# Patient Record
Sex: FEMALE
[De-identification: names, ages, dates, MRNs, and addresses within clinical notes are randomized per-mention and may not be internally consistent; named-entity substitution may affect disease eponyms.]

---

## 2019-01-22 PROBLEM — Z00.00 ENCOUNTER FOR PREVENTIVE HEALTH EXAMINATION: Status: ACTIVE | Noted: 2019-01-22

## 2019-01-29 ENCOUNTER — APPOINTMENT (OUTPATIENT)
Dept: OBGYN | Facility: CLINIC | Age: 29
End: 2019-01-29
Payer: COMMERCIAL

## 2019-01-29 ENCOUNTER — LABORATORY RESULT (OUTPATIENT)
Age: 29
End: 2019-01-29

## 2019-01-29 VITALS
OXYGEN SATURATION: 98 % | HEIGHT: 68 IN | TEMPERATURE: 98 F | DIASTOLIC BLOOD PRESSURE: 82 MMHG | SYSTOLIC BLOOD PRESSURE: 118 MMHG | BODY MASS INDEX: 21.07 KG/M2 | WEIGHT: 139 LBS | HEART RATE: 97 BPM

## 2019-01-29 PROCEDURE — 81025 URINE PREGNANCY TEST: CPT

## 2019-01-29 PROCEDURE — 36415 COLL VENOUS BLD VENIPUNCTURE: CPT

## 2019-01-29 PROCEDURE — 99385 PREV VISIT NEW AGE 18-39: CPT | Mod: 25

## 2019-01-29 NOTE — PROCEDURE
[Cervical Pap Smear] : cervical Pap smear [Liquid Base] : liquid base [GC & Chlamydia via Pap] : GC & Chlamydia via Pap [Tolerated Well] : the patient tolerated the procedure well [No Complications] : there were no complications [Intrauterine Pregnancy] : intrauterine pregnancy [Yolk Sac] : yolk sac present [Fetal Heart] : fetal heart present [CRL: ___ (mm)] : CRL - [unfilled]Umm [Current GA by Sonogram: ___ (wks)] : Current GA by Sonogram: [unfilled]Uwks [___ day(s)] : [unfilled] days [WNL] : Transvaginal OB Sonogram WNL

## 2019-01-30 ENCOUNTER — TRANSCRIPTION ENCOUNTER (OUTPATIENT)
Age: 29
End: 2019-01-30

## 2019-01-30 LAB
ABO + RH PNL BLD: NORMAL
BASOPHILS # BLD AUTO: 0.02 K/UL
BASOPHILS NFR BLD AUTO: 0.3 %
BLD GP AB SCN SERPL QL: NORMAL
C TRACH RRNA SPEC QL NAA+PROBE: NOT DETECTED
EOSINOPHIL # BLD AUTO: 0.06 K/UL
EOSINOPHIL NFR BLD AUTO: 0.8 %
HCG SERPL-MCNC: ABNORMAL MIU/ML
HCG UR QL: POSITIVE
HCT VFR BLD CALC: 40.4 %
HGB BLD-MCNC: 12.7 G/DL
IMM GRANULOCYTES NFR BLD AUTO: 0.1 %
LYMPHOCYTES # BLD AUTO: 1.72 K/UL
LYMPHOCYTES NFR BLD AUTO: 22.6 %
MAN DIFF?: NORMAL
MCHC RBC-ENTMCNC: 27.5 PG
MCHC RBC-ENTMCNC: 31.4 GM/DL
MCV RBC AUTO: 87.4 FL
MONOCYTES # BLD AUTO: 0.57 K/UL
MONOCYTES NFR BLD AUTO: 7.5 %
N GONORRHOEA RRNA SPEC QL NAA+PROBE: NOT DETECTED
NEUTROPHILS # BLD AUTO: 5.23 K/UL
NEUTROPHILS NFR BLD AUTO: 68.7 %
PLATELET # BLD AUTO: 234 K/UL
RBC # BLD: 4.62 M/UL
RBC # FLD: 13.4 %
SOURCE TP AMPLIFICATION: NORMAL
WBC # FLD AUTO: 7.61 K/UL

## 2019-01-31 LAB — BACTERIA UR CULT: NORMAL

## 2019-02-01 ENCOUNTER — APPOINTMENT (OUTPATIENT)
Dept: ANTEPARTUM | Facility: CLINIC | Age: 29
End: 2019-02-01
Payer: MEDICAID

## 2019-02-01 ENCOUNTER — ASOB RESULT (OUTPATIENT)
Age: 29
End: 2019-02-01

## 2019-02-01 PROCEDURE — 76801 OB US < 14 WKS SINGLE FETUS: CPT

## 2019-02-05 LAB — CYTOLOGY CVX/VAG DOC THIN PREP: NORMAL

## 2019-02-11 ENCOUNTER — NON-APPOINTMENT (OUTPATIENT)
Age: 29
End: 2019-02-11

## 2019-02-11 NOTE — CHIEF COMPLAINT
[Annual Visit] : annual visit [FreeTextEntry1] : -Patient presents for initial gynecological exam today.  LMP (2018), positive pregnancy test with her PCP.   This is a planned pregnancy, currently wants to continue her pregnancy. \par \par -Patient is a  with IUP 9.0 weeks with TESSA~2019   by LMP.  Patient has not had any prenatal care prior to today's  visit.  \par \par -Patient has been seen at the emergency room for vaginal spotting (2018) counseled to avoid sexual intercourse and to follow up with Ob/Gyn, denies any further symptoms since that ER visit.  Patient denies any vaginal bleeding or pelvic pain currently.  \par \par -Patient is taking prenatal vitamins daily prescribed by PCP\par \par -Patient denies history of recent travel outside of [place], nor has her partner travelled anywhere outside of [place] in the past 12 months. \par \par -Currently patient denies experiencing any vaginal itching, irregular vaginal bleeding or malodorous vaginal discharge.  Patient denies history of urinary symptoms.\par \par -Patient's last pap smear was approximately 2 years ago, WNL as per patient.  Patient denies history of abnormal pap smears.    \par \par -Patient was using OCP for contraception stopped (2018).   \par \par -Patient's denies any history of sexually transmitted diseases.   \par \par \par A/P: 28 y /o    with IUP 9.0 weeks by LMP\par \par -Pap smear / HPV screen / Gonorrhea / Chlamydia  sent today\par \par -(+) Office pregnancy test - (+) - Early IUP ~ (+)8 weeks / Gestational sac / Yolk sac noted / (+) FHR @ 160’s BPM noted by bedside ultrasound. \par \par - Urine Culture / T&S / CBC / bHCG quantitative level x 1 ordered today.\par \par -MFM – Referral for Ob sonogram for dating and pregnancy viability / FTS - referral given today\par \par -Zika Screen not needed\par \par -Advised to start daily Prenatal vitamin and Iron supplement, prescription sent to pharmacy today. \par \par -Patient was counseled on precautions for miscarriage, signs and symptoms including sudden onset of pelvic pain with or without vaginal bleeding, patient was advised to seek care at the nearest Emergency Room for evaluation and subsequently follow up at our office. \par \par -Send initial Prenatal labs next visit after pregnancy viability is confirmed.\par \par -RTO 4  weeks for initial prenatal visit.\par \par \par

## 2019-02-11 NOTE — HISTORY OF PRESENT ILLNESS
[Definite:  ___ (Date)] : the last menstrual period was [unfilled] [Normal Amount/Duration] : was of a normal amount and duration [Regular Cycle Intervals] : periods have been regular [Frequency: Q ___ days] : menstrual periods occur approximately every [unfilled] days [Menarche Age: ____] : age at menarche was [unfilled] [Menstrual Cramps] : menstrual cramps [Sexually Active] : is sexually active [Monogamous] : is monogamous [Male ___] : [unfilled] male [No Risk Factors, Testing Not Required] : no risk factors, testing is not required. [Spotting Between  Menses] : no spotting between menses [On BCP at conception] : the patient was not on BCP at conception [Contraception] : does not use contraception

## 2019-02-11 NOTE — PHYSICAL EXAM
[Awake] : awake [Alert] : alert [Soft] : soft [Oriented x3] : oriented to person, place, and time [Labia Majora] : labia major [Labia Minora] : labia minora [Normal] : clitoris [No Bleeding] : there was no active vaginal bleeding [Pap Obtained] : a Pap smear was performed [Normal Position] : in a normal position [Uterine Adnexae] : were not tender and not enlarged [RRR, No Murmurs] : RRR, no murmurs [CTAB] : CTAB [Acute Distress] : no acute distress [Mass] : no breast mass [Nipple Discharge] : no nipple discharge [Axillary LAD] : no axillary lymphadenopathy [Tender] : non tender [Discharge] : had no discharge [Tenderness] : nontender [Enlarged ___ wks] : not enlarged

## 2019-02-22 ENCOUNTER — APPOINTMENT (OUTPATIENT)
Dept: ANTEPARTUM | Facility: CLINIC | Age: 29
End: 2019-02-22
Payer: MEDICAID

## 2019-02-22 ENCOUNTER — ASOB RESULT (OUTPATIENT)
Age: 29
End: 2019-02-22

## 2019-02-22 PROCEDURE — 76801 OB US < 14 WKS SINGLE FETUS: CPT

## 2019-02-22 PROCEDURE — 36416 COLLJ CAPILLARY BLOOD SPEC: CPT

## 2019-02-22 PROCEDURE — 76813 OB US NUCHAL MEAS 1 GEST: CPT

## 2019-03-01 ENCOUNTER — NON-APPOINTMENT (OUTPATIENT)
Age: 29
End: 2019-03-01

## 2019-03-01 ENCOUNTER — TRANSCRIPTION ENCOUNTER (OUTPATIENT)
Age: 29
End: 2019-03-01

## 2019-03-01 ENCOUNTER — APPOINTMENT (OUTPATIENT)
Dept: OBGYN | Facility: CLINIC | Age: 29
End: 2019-03-01
Payer: COMMERCIAL

## 2019-03-01 VITALS — WEIGHT: 139 LBS | BODY MASS INDEX: 21.07 KG/M2 | HEIGHT: 68 IN

## 2019-03-01 VITALS
HEIGHT: 68 IN | BODY MASS INDEX: 21.07 KG/M2 | WEIGHT: 139 LBS | SYSTOLIC BLOOD PRESSURE: 122 MMHG | DIASTOLIC BLOOD PRESSURE: 67 MMHG

## 2019-03-01 DIAGNOSIS — Z34.90 ENCOUNTER FOR SUPERVISION OF NORMAL PREGNANCY, UNSPECIFIED, UNSPECIFIED TRIMESTER: ICD-10-CM

## 2019-03-01 DIAGNOSIS — Z01.419 ENCOUNTER FOR GYNECOLOGICAL EXAMINATION (GENERAL) (ROUTINE) W/OUT ABNORMAL FINDINGS: ICD-10-CM

## 2019-03-01 PROCEDURE — 0502F SUBSEQUENT PRENATAL CARE: CPT

## 2019-03-04 ENCOUNTER — EMERGENCY (EMERGENCY)
Facility: HOSPITAL | Age: 29
LOS: 1 days | Discharge: HOME | End: 2019-03-04
Attending: EMERGENCY MEDICINE
Payer: COMMERCIAL

## 2019-03-04 VITALS
DIASTOLIC BLOOD PRESSURE: 83 MMHG | OXYGEN SATURATION: 100 % | TEMPERATURE: 99 F | SYSTOLIC BLOOD PRESSURE: 135 MMHG | HEART RATE: 137 BPM | HEIGHT: 66 IN | WEIGHT: 138.89 LBS | RESPIRATION RATE: 20 BRPM

## 2019-03-04 VITALS
DIASTOLIC BLOOD PRESSURE: 70 MMHG | SYSTOLIC BLOOD PRESSURE: 110 MMHG | OXYGEN SATURATION: 100 % | TEMPERATURE: 98 F | HEART RATE: 106 BPM | RESPIRATION RATE: 20 BRPM

## 2019-03-04 DIAGNOSIS — Z98.82 BREAST IMPLANT STATUS: Chronic | ICD-10-CM

## 2019-03-04 LAB
ACETONE SERPL-MCNC: NEGATIVE — SIGNIFICANT CHANGE UP
ALBUMIN SERPL ELPH-MCNC: 3.2 G/DL — LOW (ref 3.5–5)
ALP SERPL-CCNC: 39 U/L — LOW (ref 40–120)
ALT FLD-CCNC: 20 U/L DA — SIGNIFICANT CHANGE UP (ref 10–60)
ANION GAP SERPL CALC-SCNC: 10 MMOL/L — SIGNIFICANT CHANGE UP (ref 5–17)
ANISOCYTOSIS BLD QL: SLIGHT — SIGNIFICANT CHANGE UP
APPEARANCE UR: CLEAR — SIGNIFICANT CHANGE UP
AST SERPL-CCNC: 17 U/L — SIGNIFICANT CHANGE UP (ref 10–40)
BACTERIA # UR AUTO: ABNORMAL /HPF
BACTERIA UR CULT: NORMAL
BASOPHILS # BLD AUTO: 0.01 K/UL — SIGNIFICANT CHANGE UP (ref 0–0.2)
BASOPHILS NFR BLD AUTO: 0.1 % — SIGNIFICANT CHANGE UP (ref 0–2)
BILIRUB SERPL-MCNC: 0.5 MG/DL — SIGNIFICANT CHANGE UP (ref 0.2–1.2)
BILIRUB UR-MCNC: NEGATIVE — SIGNIFICANT CHANGE UP
BUN SERPL-MCNC: 10 MG/DL — SIGNIFICANT CHANGE UP (ref 7–18)
CALCIUM SERPL-MCNC: 8.1 MG/DL — LOW (ref 8.4–10.5)
CHLORIDE SERPL-SCNC: 105 MMOL/L — SIGNIFICANT CHANGE UP (ref 96–108)
CO2 SERPL-SCNC: 21 MMOL/L — LOW (ref 22–31)
COLOR SPEC: YELLOW — SIGNIFICANT CHANGE UP
COMMENT - URINE: SIGNIFICANT CHANGE UP
CREAT SERPL-MCNC: 0.56 MG/DL — SIGNIFICANT CHANGE UP (ref 0.5–1.3)
DIFF PNL FLD: ABNORMAL
EOSINOPHIL # BLD AUTO: 0 K/UL — SIGNIFICANT CHANGE UP (ref 0–0.5)
EOSINOPHIL NFR BLD AUTO: 0 % — SIGNIFICANT CHANGE UP (ref 0–6)
EPI CELLS # UR: ABNORMAL /HPF
GLUCOSE SERPL-MCNC: 84 MG/DL — SIGNIFICANT CHANGE UP (ref 70–99)
GLUCOSE UR QL: NEGATIVE — SIGNIFICANT CHANGE UP
HBA1C MFR BLD HPLC: 5 %
HBV SURFACE AG SER QL: NONREACTIVE
HCG SERPL-ACNC: HIGH MIU/ML
HCT VFR BLD CALC: 39.1 % — SIGNIFICANT CHANGE UP (ref 34.5–45)
HCV AB SER QL: NONREACTIVE
HCV S/CO RATIO: 0.08 S/CO
HGB BLD-MCNC: 12.9 G/DL — SIGNIFICANT CHANGE UP (ref 11.5–15.5)
HIV1+2 AB SPEC QL IA.RAPID: NONREACTIVE
IMM GRANULOCYTES NFR BLD AUTO: 0.5 % — SIGNIFICANT CHANGE UP (ref 0–1.5)
KETONES UR-MCNC: NEGATIVE — SIGNIFICANT CHANGE UP
LEAD BLD-MCNC: <1 UG/DL
LEUKOCYTE ESTERASE UR-ACNC: NEGATIVE — SIGNIFICANT CHANGE UP
LYMPHOCYTES # BLD AUTO: 0.44 K/UL — LOW (ref 1–3.3)
LYMPHOCYTES # BLD AUTO: 5 % — LOW (ref 13–44)
MAGNESIUM SERPL-MCNC: 1.8 MG/DL — SIGNIFICANT CHANGE UP (ref 1.6–2.6)
MANUAL SMEAR VERIFICATION: SIGNIFICANT CHANGE UP
MCHC RBC-ENTMCNC: 27.9 PG — SIGNIFICANT CHANGE UP (ref 27–34)
MCHC RBC-ENTMCNC: 33 GM/DL — SIGNIFICANT CHANGE UP (ref 32–36)
MCV RBC AUTO: 84.6 FL — SIGNIFICANT CHANGE UP (ref 80–100)
MONOCYTES # BLD AUTO: 0.59 K/UL — SIGNIFICANT CHANGE UP (ref 0–0.9)
MONOCYTES NFR BLD AUTO: 6.7 % — SIGNIFICANT CHANGE UP (ref 2–14)
NEUTROPHILS # BLD AUTO: 7.67 K/UL — HIGH (ref 1.8–7.4)
NEUTROPHILS NFR BLD AUTO: 87.7 % — HIGH (ref 43–77)
NITRITE UR-MCNC: NEGATIVE — SIGNIFICANT CHANGE UP
NRBC # BLD: 0 /100 WBCS — SIGNIFICANT CHANGE UP (ref 0–0)
OVALOCYTES BLD QL SMEAR: SLIGHT — SIGNIFICANT CHANGE UP
PH UR: 7 — SIGNIFICANT CHANGE UP (ref 5–8)
PLAT MORPH BLD: NORMAL — SIGNIFICANT CHANGE UP
PLATELET # BLD AUTO: 189 K/UL — SIGNIFICANT CHANGE UP (ref 150–400)
POIKILOCYTOSIS BLD QL AUTO: SLIGHT — SIGNIFICANT CHANGE UP
POTASSIUM SERPL-MCNC: 3.5 MMOL/L — SIGNIFICANT CHANGE UP (ref 3.5–5.3)
POTASSIUM SERPL-SCNC: 3.5 MMOL/L — SIGNIFICANT CHANGE UP (ref 3.5–5.3)
PROT SERPL-MCNC: 7.2 G/DL — SIGNIFICANT CHANGE UP (ref 6–8.3)
PROT UR-MCNC: 15
RBC # BLD: 4.62 M/UL — SIGNIFICANT CHANGE UP (ref 3.8–5.2)
RBC # FLD: 12.6 % — SIGNIFICANT CHANGE UP (ref 10.3–14.5)
RBC BLD AUTO: ABNORMAL
RBC CASTS # UR COMP ASSIST: SIGNIFICANT CHANGE UP /HPF (ref 0–2)
RUBV IGG FLD-ACNC: 20.9 INDEX
RUBV IGG SER-IMP: POSITIVE
SODIUM SERPL-SCNC: 136 MMOL/L — SIGNIFICANT CHANGE UP (ref 135–145)
SP GR SPEC: 1.01 — SIGNIFICANT CHANGE UP (ref 1.01–1.02)
T4 FREE SERPL-MCNC: 1.3 NG/DL
THYROPEROXIDASE AB SERPL IA-ACNC: 117 IU/ML
TSH SERPL-ACNC: 1.14 UIU/ML
UROBILINOGEN FLD QL: NEGATIVE — SIGNIFICANT CHANGE UP
VZV AB TITR SER: POSITIVE
VZV IGG SER IF-ACNC: >4000 INDEX
WBC # BLD: 8.75 K/UL — SIGNIFICANT CHANGE UP (ref 3.8–10.5)
WBC # FLD AUTO: 8.75 K/UL — SIGNIFICANT CHANGE UP (ref 3.8–10.5)
WBC UR QL: SIGNIFICANT CHANGE UP /HPF (ref 0–5)

## 2019-03-04 PROCEDURE — 99285 EMERGENCY DEPT VISIT HI MDM: CPT

## 2019-03-04 PROCEDURE — 99284 EMERGENCY DEPT VISIT MOD MDM: CPT | Mod: 25

## 2019-03-04 PROCEDURE — 81001 URINALYSIS AUTO W/SCOPE: CPT

## 2019-03-04 PROCEDURE — 84702 CHORIONIC GONADOTROPIN TEST: CPT

## 2019-03-04 PROCEDURE — 82009 KETONE BODYS QUAL: CPT

## 2019-03-04 PROCEDURE — 87086 URINE CULTURE/COLONY COUNT: CPT

## 2019-03-04 PROCEDURE — 82962 GLUCOSE BLOOD TEST: CPT

## 2019-03-04 PROCEDURE — 80053 COMPREHEN METABOLIC PANEL: CPT

## 2019-03-04 PROCEDURE — 36415 COLL VENOUS BLD VENIPUNCTURE: CPT

## 2019-03-04 PROCEDURE — 85027 COMPLETE CBC AUTOMATED: CPT

## 2019-03-04 PROCEDURE — 96365 THER/PROPH/DIAG IV INF INIT: CPT

## 2019-03-04 PROCEDURE — 93005 ELECTROCARDIOGRAM TRACING: CPT

## 2019-03-04 PROCEDURE — 83735 ASSAY OF MAGNESIUM: CPT

## 2019-03-04 RX ORDER — SODIUM CHLORIDE 9 MG/ML
1000 INJECTION INTRAMUSCULAR; INTRAVENOUS; SUBCUTANEOUS ONCE
Refills: 0 | Status: DISCONTINUED | OUTPATIENT
Start: 2019-03-04 | End: 2019-03-08

## 2019-03-04 RX ORDER — DOXYLAMINE SUCCINATE AND PYRIDOXINE HYDROCHLORIDE, DELAYED RELEASE TABLETS 10 MG/10 MG 10; 10 MG/1; MG/1
2 TABLET, DELAYED RELEASE ORAL
Qty: 28 | Refills: 0
Start: 2019-03-04 | End: 2019-03-17

## 2019-03-04 RX ORDER — SODIUM CHLORIDE 9 MG/ML
1000 INJECTION INTRAMUSCULAR; INTRAVENOUS; SUBCUTANEOUS ONCE
Refills: 0 | Status: COMPLETED | OUTPATIENT
Start: 2019-03-04 | End: 2019-03-04

## 2019-03-04 RX ORDER — METOCLOPRAMIDE HCL 10 MG
10 TABLET ORAL ONCE
Refills: 0 | Status: COMPLETED | OUTPATIENT
Start: 2019-03-04 | End: 2019-03-04

## 2019-03-04 RX ORDER — ACETAMINOPHEN 500 MG
650 TABLET ORAL ONCE
Refills: 0 | Status: COMPLETED | OUTPATIENT
Start: 2019-03-04 | End: 2019-03-04

## 2019-03-04 RX ORDER — SODIUM CHLORIDE 9 MG/ML
1000 INJECTION INTRAMUSCULAR; INTRAVENOUS; SUBCUTANEOUS
Refills: 0 | Status: DISCONTINUED | OUTPATIENT
Start: 2019-03-04 | End: 2019-03-08

## 2019-03-04 RX ORDER — SODIUM CHLORIDE 9 MG/ML
3 INJECTION INTRAMUSCULAR; INTRAVENOUS; SUBCUTANEOUS ONCE
Refills: 0 | Status: COMPLETED | OUTPATIENT
Start: 2019-03-04 | End: 2019-03-04

## 2019-03-04 RX ADMIN — Medication 650 MILLIGRAM(S): at 12:43

## 2019-03-04 RX ADMIN — Medication 104 MILLIGRAM(S): at 12:43

## 2019-03-04 RX ADMIN — Medication 10 MILLIGRAM(S): at 13:20

## 2019-03-04 RX ADMIN — Medication 650 MILLIGRAM(S): at 12:41

## 2019-03-04 RX ADMIN — SODIUM CHLORIDE 1000 MILLILITER(S): 9 INJECTION INTRAMUSCULAR; INTRAVENOUS; SUBCUTANEOUS at 13:20

## 2019-03-04 RX ADMIN — SODIUM CHLORIDE 3 MILLILITER(S): 9 INJECTION INTRAMUSCULAR; INTRAVENOUS; SUBCUTANEOUS at 12:42

## 2019-03-04 RX ADMIN — SODIUM CHLORIDE 1000 MILLILITER(S): 9 INJECTION INTRAMUSCULAR; INTRAVENOUS; SUBCUTANEOUS at 12:42

## 2019-03-04 NOTE — ED ADULT NURSE NOTE - OBJECTIVE STATEMENT
Patient came to the ED a/o x 3 ambulates c/o vomiting and diarrhea since last night. Denies abdominal pain pt states she is 14 weeks pregnant.

## 2019-03-04 NOTE — ED PROVIDER NOTE - CARE PLAN
Principal Discharge DX:	Gastroenteritis  Secondary Diagnosis:	Dehydration  Secondary Diagnosis:	Tachycardia

## 2019-03-04 NOTE — ED PROVIDER NOTE - OBJECTIVE STATEMENT
28 y.o. female , LMP 18 p/w vomitting and diarrhea since PM. Nauseaus, couldn't hold food down, episodes all night. 7 episodes watery diarrhea. No recent antibiotic use, travel, no sick contacts. Feels weak, has headache 7/10 bitemporal. Has pain in legs and back. No dysuria. No medications taken. 28 y.o. female , LMP 18 p/w vomiting and diarrhea since PM. Nauseous, couldn't hold food down, episodes all night. 7 episodes watery diarrhea. No recent antibiotic use, travel, no sick contacts. Feels weak, has headache 7/10 bitemporal. Has pain in legs and back. No dysuria. No medications taken.

## 2019-03-04 NOTE — ED ADULT NURSE NOTE - NSIMPLEMENTINTERV_GEN_ALL_ED
Implemented All Universal Safety Interventions:  [place] to call system. Call bell, personal items and telephone within reach. Instruct patient to call for assistance. Room bathroom lighting operational. Non-slip footwear when patient is off stretcher. Physically safe environment: no spills, clutter or unnecessary equipment. Stretcher in lowest position, wheels locked, appropriate side rails in place.

## 2019-03-05 LAB
CULTURE RESULTS: SIGNIFICANT CHANGE UP
SPECIMEN SOURCE: SIGNIFICANT CHANGE UP

## 2019-03-06 LAB
HGB A MFR BLD: 97.5 %
HGB A2 MFR BLD: 2.5 %
HGB FRACT BLD-IMP: NORMAL
M TB IFN-G BLD-IMP: NEGATIVE
QUANTIFERON TB PLUS MITOGEN MINUS NIL: 6.78 IU/ML
QUANTIFERON TB PLUS NIL: 0.06 IU/ML
QUANTIFERON TB PLUS TB1 MINUS NIL: 0 IU/ML
QUANTIFERON TB PLUS TB2 MINUS NIL: 0.01 IU/ML
T PALLIDUM AB SER QL IA: NEGATIVE

## 2019-03-07 LAB — AR GENE MUT ANL BLD/T: NEGATIVE

## 2019-03-08 LAB — CFTR MUT TESTED BLD/T: NEGATIVE

## 2019-03-09 LAB — FMR1 GENE MUT ANL BLD/T: NORMAL

## 2019-03-14 ENCOUNTER — TRANSCRIPTION ENCOUNTER (OUTPATIENT)
Age: 29
End: 2019-03-14

## 2019-04-01 ENCOUNTER — NON-APPOINTMENT (OUTPATIENT)
Age: 29
End: 2019-04-01

## 2019-04-01 ENCOUNTER — APPOINTMENT (OUTPATIENT)
Dept: OBGYN | Facility: CLINIC | Age: 29
End: 2019-04-01
Payer: COMMERCIAL

## 2019-04-01 VITALS
SYSTOLIC BLOOD PRESSURE: 110 MMHG | HEIGHT: 68 IN | DIASTOLIC BLOOD PRESSURE: 62 MMHG | WEIGHT: 144 LBS | BODY MASS INDEX: 21.82 KG/M2

## 2019-04-01 PROCEDURE — 99213 OFFICE O/P EST LOW 20 MIN: CPT

## 2019-04-03 LAB — BACTERIA UR CULT: NORMAL

## 2019-04-05 LAB
1ST TRIMESTER DATA: NORMAL
2ND TRIMESTER DATA: NORMAL
AFP PNL SERPL: NORMAL
AFP SERPL-ACNC: NORMAL
AFP SERPL-ACNC: NORMAL
B-HCG FREE SERPL-MCNC: NORMAL
CLINICAL BIOCHEMIST REVIEW: NORMAL
FREE BETA HCG 1ST TRIMESTER: NORMAL
INHIBIN A SERPL-MCNC: NORMAL
NOTES NTD: NORMAL
NT: NORMAL
PAPP-A SERPL-ACNC: NORMAL
U ESTRIOL SERPL-SCNC: NORMAL

## 2019-04-18 ENCOUNTER — ASOB RESULT (OUTPATIENT)
Age: 29
End: 2019-04-18

## 2019-04-18 ENCOUNTER — APPOINTMENT (OUTPATIENT)
Dept: ANTEPARTUM | Facility: CLINIC | Age: 29
End: 2019-04-18
Payer: COMMERCIAL

## 2019-04-18 PROCEDURE — 76805 OB US >/= 14 WKS SNGL FETUS: CPT

## 2019-04-18 PROCEDURE — 76817 TRANSVAGINAL US OBSTETRIC: CPT

## 2019-05-02 ENCOUNTER — APPOINTMENT (OUTPATIENT)
Dept: OBGYN | Facility: CLINIC | Age: 29
End: 2019-05-02
Payer: COMMERCIAL

## 2019-05-02 ENCOUNTER — NON-APPOINTMENT (OUTPATIENT)
Age: 29
End: 2019-05-02

## 2019-05-02 PROCEDURE — 0502F SUBSEQUENT PRENATAL CARE: CPT

## 2019-06-03 ENCOUNTER — NON-APPOINTMENT (OUTPATIENT)
Age: 29
End: 2019-06-03

## 2019-06-03 ENCOUNTER — APPOINTMENT (OUTPATIENT)
Dept: OBGYN | Facility: CLINIC | Age: 29
End: 2019-06-03
Payer: COMMERCIAL

## 2019-06-03 VITALS
SYSTOLIC BLOOD PRESSURE: 112 MMHG | HEIGHT: 68 IN | BODY MASS INDEX: 23.79 KG/M2 | WEIGHT: 157 LBS | DIASTOLIC BLOOD PRESSURE: 70 MMHG

## 2019-06-03 LAB
BASOPHILS # BLD AUTO: 0.04 K/UL
BASOPHILS NFR BLD AUTO: 0.4 %
EOSINOPHIL # BLD AUTO: 0.09 K/UL
EOSINOPHIL NFR BLD AUTO: 1 %
HCT VFR BLD CALC: 37.7 %
HGB BLD-MCNC: 11.9 G/DL
IMM GRANULOCYTES NFR BLD AUTO: 2.4 %
LYMPHOCYTES # BLD AUTO: 1.5 K/UL
LYMPHOCYTES NFR BLD AUTO: 15.8 %
MAN DIFF?: NORMAL
MCHC RBC-ENTMCNC: 28.8 PG
MCHC RBC-ENTMCNC: 31.6 GM/DL
MCV RBC AUTO: 91.3 FL
MONOCYTES # BLD AUTO: 0.59 K/UL
MONOCYTES NFR BLD AUTO: 6.2 %
NEUTROPHILS # BLD AUTO: 7.02 K/UL
NEUTROPHILS NFR BLD AUTO: 74.2 %
PLATELET # BLD AUTO: 191 K/UL
RBC # BLD: 4.13 M/UL
RBC # FLD: 13.4 %
WBC # FLD AUTO: 9.47 K/UL

## 2019-06-03 PROCEDURE — 0502F SUBSEQUENT PRENATAL CARE: CPT

## 2019-06-03 RX ORDER — AVOBENZONE, HOMOSALATE, OCTISALATE, OCTOCRYLENE, AND OXYBENZONE 29.4; 147; 49; 25.4; 58.8 MG/ML; MG/ML; MG/ML; MG/ML; MG/ML
51.7 LOTION TOPICAL
Qty: 1 | Refills: 1 | Status: ACTIVE | COMMUNITY
Start: 2019-06-03 | End: 1900-01-01

## 2019-06-04 LAB
C TRACH RRNA SPEC QL NAA+PROBE: NOT DETECTED
GLUCOSE 1H P 50 G GLC PO SERPL-MCNC: 112 MG/DL
HBV SURFACE AG SER QL: NONREACTIVE
HCV AB SER QL: NONREACTIVE
HCV S/CO RATIO: 0.14 S/CO
HIV1+2 AB SPEC QL IA.RAPID: NONREACTIVE
N GONORRHOEA RRNA SPEC QL NAA+PROBE: NOT DETECTED
SOURCE AMPLIFICATION: NORMAL

## 2019-06-06 LAB — RPR SER-TITR: NORMAL

## 2019-06-10 ENCOUNTER — TRANSCRIPTION ENCOUNTER (OUTPATIENT)
Age: 29
End: 2019-06-10

## 2019-06-13 ENCOUNTER — TRANSCRIPTION ENCOUNTER (OUTPATIENT)
Age: 29
End: 2019-06-13

## 2019-06-14 ENCOUNTER — NON-APPOINTMENT (OUTPATIENT)
Age: 29
End: 2019-06-14

## 2019-06-14 ENCOUNTER — APPOINTMENT (OUTPATIENT)
Dept: OBGYN | Facility: CLINIC | Age: 29
End: 2019-06-14
Payer: COMMERCIAL

## 2019-06-14 VITALS — DIASTOLIC BLOOD PRESSURE: 72 MMHG | SYSTOLIC BLOOD PRESSURE: 113 MMHG | HEART RATE: 90 BPM

## 2019-06-14 VITALS
SYSTOLIC BLOOD PRESSURE: 138 MMHG | DIASTOLIC BLOOD PRESSURE: 76 MMHG | HEIGHT: 68 IN | BODY MASS INDEX: 24.71 KG/M2 | WEIGHT: 163 LBS | HEART RATE: 101 BPM

## 2019-06-14 PROCEDURE — 36415 COLL VENOUS BLD VENIPUNCTURE: CPT

## 2019-06-14 PROCEDURE — 0502F SUBSEQUENT PRENATAL CARE: CPT

## 2019-06-15 LAB
HBV SURFACE AG SER QL: NONREACTIVE
HCV AB SER QL: NONREACTIVE
HCV S/CO RATIO: 0.13 S/CO
HIV1+2 AB SPEC QL IA.RAPID: NONREACTIVE
HSV 1+2 IGG SER IA-IMP: NEGATIVE
HSV 1+2 IGG SER IA-IMP: POSITIVE
HSV1 IGG SER QL: 54.3 INDEX
HSV2 IGG SER QL: 0.14 INDEX
MEV IGG FLD QL IA: <5 AU/ML
MEV IGG+IGM SER-IMP: NEGATIVE

## 2019-06-17 ENCOUNTER — TRANSCRIPTION ENCOUNTER (OUTPATIENT)
Age: 29
End: 2019-06-17

## 2019-06-17 LAB
CANDIDA VAG CYTO: NOT DETECTED
G VAGINALIS+PREV SP MTYP VAG QL MICRO: NOT DETECTED
HSV1 IGM SER QL: NORMAL TITER
HSV2 AB FLD-ACNC: NORMAL TITER
T VAGINALIS VAG QL WET PREP: NOT DETECTED

## 2019-06-19 LAB — RPR SER-TITR: NORMAL

## 2019-07-01 ENCOUNTER — APPOINTMENT (OUTPATIENT)
Dept: OBGYN | Facility: CLINIC | Age: 29
End: 2019-07-01
Payer: COMMERCIAL

## 2019-07-01 VITALS
SYSTOLIC BLOOD PRESSURE: 120 MMHG | DIASTOLIC BLOOD PRESSURE: 72 MMHG | WEIGHT: 166 LBS | BODY MASS INDEX: 25.16 KG/M2 | HEIGHT: 68 IN

## 2019-07-01 DIAGNOSIS — Z11.3 ENCOUNTER FOR SCREENING FOR INFECTIONS WITH A PREDOMINANTLY SEXUAL MODE OF TRANSMISSION: ICD-10-CM

## 2019-07-01 DIAGNOSIS — N76.1 SUBACUTE AND CHRONIC VAGINITIS: ICD-10-CM

## 2019-07-01 DIAGNOSIS — Z34.92 ENCOUNTER FOR SUPERVISION OF NORMAL PREGNANCY, UNSPECIFIED, SECOND TRIMESTER: ICD-10-CM

## 2019-07-01 DIAGNOSIS — Z23 ENCOUNTER FOR IMMUNIZATION: ICD-10-CM

## 2019-07-01 PROCEDURE — 0502F SUBSEQUENT PRENATAL CARE: CPT

## 2019-07-17 ENCOUNTER — NON-APPOINTMENT (OUTPATIENT)
Age: 29
End: 2019-07-17

## 2019-07-17 ENCOUNTER — APPOINTMENT (OUTPATIENT)
Dept: OBGYN | Facility: CLINIC | Age: 29
End: 2019-07-17
Payer: COMMERCIAL

## 2019-07-17 VITALS
WEIGHT: 171 LBS | DIASTOLIC BLOOD PRESSURE: 63 MMHG | SYSTOLIC BLOOD PRESSURE: 107 MMHG | BODY MASS INDEX: 25.91 KG/M2 | HEIGHT: 68 IN

## 2019-07-17 PROCEDURE — 0502F SUBSEQUENT PRENATAL CARE: CPT | Mod: 25

## 2019-07-17 PROCEDURE — 81003 URINALYSIS AUTO W/O SCOPE: CPT | Mod: QW

## 2019-07-31 ENCOUNTER — APPOINTMENT (OUTPATIENT)
Dept: OBGYN | Facility: CLINIC | Age: 29
End: 2019-07-31
Payer: COMMERCIAL

## 2019-07-31 ENCOUNTER — NON-APPOINTMENT (OUTPATIENT)
Age: 29
End: 2019-07-31

## 2019-07-31 VITALS
SYSTOLIC BLOOD PRESSURE: 121 MMHG | BODY MASS INDEX: 25.91 KG/M2 | WEIGHT: 171 LBS | HEIGHT: 68 IN | DIASTOLIC BLOOD PRESSURE: 77 MMHG

## 2019-07-31 DIAGNOSIS — Z3A.33 33 WEEKS GESTATION OF PREGNANCY: ICD-10-CM

## 2019-07-31 PROCEDURE — 0502F SUBSEQUENT PRENATAL CARE: CPT | Mod: 25

## 2019-07-31 PROCEDURE — 81003 URINALYSIS AUTO W/O SCOPE: CPT | Mod: QW

## 2019-08-07 ENCOUNTER — APPOINTMENT (OUTPATIENT)
Dept: OBGYN | Facility: CLINIC | Age: 29
End: 2019-08-07
Payer: COMMERCIAL

## 2019-08-07 ENCOUNTER — NON-APPOINTMENT (OUTPATIENT)
Age: 29
End: 2019-08-07

## 2019-08-07 VITALS
WEIGHT: 176 LBS | BODY MASS INDEX: 26.67 KG/M2 | HEIGHT: 68 IN | DIASTOLIC BLOOD PRESSURE: 78 MMHG | SYSTOLIC BLOOD PRESSURE: 128 MMHG

## 2019-08-07 DIAGNOSIS — Z3A.35 35 WEEKS GESTATION OF PREGNANCY: ICD-10-CM

## 2019-08-07 PROCEDURE — 81003 URINALYSIS AUTO W/O SCOPE: CPT | Mod: QW

## 2019-08-07 PROCEDURE — 0502F SUBSEQUENT PRENATAL CARE: CPT

## 2019-08-07 PROCEDURE — 36415 COLL VENOUS BLD VENIPUNCTURE: CPT

## 2019-08-09 LAB
BASOPHILS # BLD AUTO: 0.04 K/UL
BASOPHILS NFR BLD AUTO: 0.4 %
C TRACH RRNA SPEC QL NAA+PROBE: NOT DETECTED
EOSINOPHIL # BLD AUTO: 0.08 K/UL
EOSINOPHIL NFR BLD AUTO: 0.8 %
GP B STREP DNA SPEC QL NAA+PROBE: NORMAL
GP B STREP DNA SPEC QL NAA+PROBE: NOT DETECTED
HCT VFR BLD CALC: 39.8 %
HGB BLD-MCNC: 12.7 G/DL
HIV1+2 AB SPEC QL IA.RAPID: NONREACTIVE
IMM GRANULOCYTES NFR BLD AUTO: 1.3 %
LYMPHOCYTES # BLD AUTO: 1.64 K/UL
LYMPHOCYTES NFR BLD AUTO: 17.2 %
MAN DIFF?: NORMAL
MCHC RBC-ENTMCNC: 28.5 PG
MCHC RBC-ENTMCNC: 31.9 GM/DL
MCV RBC AUTO: 89.4 FL
MONOCYTES # BLD AUTO: 0.82 K/UL
MONOCYTES NFR BLD AUTO: 8.6 %
N GONORRHOEA RRNA SPEC QL NAA+PROBE: NOT DETECTED
NEUTROPHILS # BLD AUTO: 6.84 K/UL
NEUTROPHILS NFR BLD AUTO: 71.7 %
PLATELET # BLD AUTO: 160 K/UL
RBC # BLD: 4.45 M/UL
RBC # FLD: 13 %
SOURCE AMPLIFICATION: NORMAL
SOURCE GBS: NORMAL
T PALLIDUM AB SER QL IA: NEGATIVE
WBC # FLD AUTO: 9.54 K/UL

## 2019-08-12 ENCOUNTER — NON-APPOINTMENT (OUTPATIENT)
Age: 29
End: 2019-08-12

## 2019-08-12 ENCOUNTER — APPOINTMENT (OUTPATIENT)
Dept: OBGYN | Facility: CLINIC | Age: 29
End: 2019-08-12
Payer: COMMERCIAL

## 2019-08-12 VITALS
DIASTOLIC BLOOD PRESSURE: 74 MMHG | BODY MASS INDEX: 27.13 KG/M2 | SYSTOLIC BLOOD PRESSURE: 127 MMHG | HEIGHT: 68 IN | WEIGHT: 179 LBS | HEART RATE: 91 BPM | OXYGEN SATURATION: 98 % | TEMPERATURE: 98.6 F

## 2019-08-12 PROCEDURE — 0502F SUBSEQUENT PRENATAL CARE: CPT | Mod: 25

## 2019-08-12 PROCEDURE — 81003 URINALYSIS AUTO W/O SCOPE: CPT | Mod: QW

## 2019-08-21 ENCOUNTER — NON-APPOINTMENT (OUTPATIENT)
Age: 29
End: 2019-08-21

## 2019-08-21 ENCOUNTER — APPOINTMENT (OUTPATIENT)
Dept: OBGYN | Facility: CLINIC | Age: 29
End: 2019-08-21
Payer: COMMERCIAL

## 2019-08-21 VITALS
BODY MASS INDEX: 27.13 KG/M2 | HEIGHT: 68 IN | SYSTOLIC BLOOD PRESSURE: 119 MMHG | WEIGHT: 179 LBS | DIASTOLIC BLOOD PRESSURE: 75 MMHG

## 2019-08-21 DIAGNOSIS — Z3A.36 36 WEEKS GESTATION OF PREGNANCY: ICD-10-CM

## 2019-08-21 PROCEDURE — 0502F SUBSEQUENT PRENATAL CARE: CPT | Mod: 25

## 2019-08-21 PROCEDURE — 81003 URINALYSIS AUTO W/O SCOPE: CPT | Mod: QW

## 2019-08-28 ENCOUNTER — APPOINTMENT (OUTPATIENT)
Dept: OBGYN | Facility: CLINIC | Age: 29
End: 2019-08-28
Payer: COMMERCIAL

## 2019-08-28 ENCOUNTER — NON-APPOINTMENT (OUTPATIENT)
Age: 29
End: 2019-08-28

## 2019-08-28 VITALS
WEIGHT: 180 LBS | HEIGHT: 68 IN | DIASTOLIC BLOOD PRESSURE: 75 MMHG | BODY MASS INDEX: 27.28 KG/M2 | SYSTOLIC BLOOD PRESSURE: 118 MMHG

## 2019-08-28 DIAGNOSIS — Z3A.38 38 WEEKS GESTATION OF PREGNANCY: ICD-10-CM

## 2019-08-28 PROCEDURE — 0502F SUBSEQUENT PRENATAL CARE: CPT | Mod: 25

## 2019-08-28 PROCEDURE — 81003 URINALYSIS AUTO W/O SCOPE: CPT | Mod: QW

## 2019-09-01 ENCOUNTER — OUTPATIENT (OUTPATIENT)
Dept: OUTPATIENT SERVICES | Facility: HOSPITAL | Age: 29
LOS: 1 days | End: 2019-09-01
Payer: COMMERCIAL

## 2019-09-01 DIAGNOSIS — Z98.82 BREAST IMPLANT STATUS: Chronic | ICD-10-CM

## 2019-09-01 PROCEDURE — G9001: CPT

## 2019-09-04 ENCOUNTER — INPATIENT (INPATIENT)
Facility: HOSPITAL | Age: 29
LOS: 2 days | Discharge: HOME | End: 2019-09-07
Attending: OBSTETRICS & GYNECOLOGY | Admitting: OBSTETRICS & GYNECOLOGY
Payer: COMMERCIAL

## 2019-09-04 ENCOUNTER — APPOINTMENT (OUTPATIENT)
Dept: ANTEPARTUM | Facility: CLINIC | Age: 29
End: 2019-09-04
Payer: MEDICAID

## 2019-09-04 ENCOUNTER — NON-APPOINTMENT (OUTPATIENT)
Age: 29
End: 2019-09-04

## 2019-09-04 ENCOUNTER — ASOB RESULT (OUTPATIENT)
Age: 29
End: 2019-09-04

## 2019-09-04 ENCOUNTER — APPOINTMENT (OUTPATIENT)
Dept: OBGYN | Facility: CLINIC | Age: 29
End: 2019-09-04
Payer: COMMERCIAL

## 2019-09-04 VITALS — HEIGHT: 68 IN | WEIGHT: 178.57 LBS

## 2019-09-04 VITALS
WEIGHT: 181 LBS | SYSTOLIC BLOOD PRESSURE: 137 MMHG | DIASTOLIC BLOOD PRESSURE: 81 MMHG | BODY MASS INDEX: 27.43 KG/M2 | HEIGHT: 68 IN

## 2019-09-04 DIAGNOSIS — Z98.82 BREAST IMPLANT STATUS: Chronic | ICD-10-CM

## 2019-09-04 DIAGNOSIS — O26.899 OTHER SPECIFIED PREGNANCY RELATED CONDITIONS, UNSPECIFIED TRIMESTER: ICD-10-CM

## 2019-09-04 DIAGNOSIS — Z34.80 ENCOUNTER FOR SUPERVISION OF OTHER NORMAL PREGNANCY, UNSPECIFIED TRIMESTER: ICD-10-CM

## 2019-09-04 DIAGNOSIS — Z3A.39 39 WEEKS GESTATION OF PREGNANCY: ICD-10-CM

## 2019-09-04 DIAGNOSIS — Z3A.00 WEEKS OF GESTATION OF PREGNANCY NOT SPECIFIED: ICD-10-CM

## 2019-09-04 LAB
ALBUMIN SERPL ELPH-MCNC: 2.9 G/DL — LOW (ref 3.5–5)
ALP SERPL-CCNC: 171 U/L — HIGH (ref 40–120)
ALT FLD-CCNC: 26 U/L DA — SIGNIFICANT CHANGE UP (ref 10–60)
ANION GAP SERPL CALC-SCNC: 9 MMOL/L — SIGNIFICANT CHANGE UP (ref 5–17)
APPEARANCE UR: ABNORMAL
APTT BLD: 27.4 SEC — LOW (ref 27.5–36.3)
AST SERPL-CCNC: 23 U/L — SIGNIFICANT CHANGE UP (ref 10–40)
BACTERIA # UR AUTO: ABNORMAL /HPF
BASOPHILS # BLD AUTO: 0.03 K/UL — SIGNIFICANT CHANGE UP (ref 0–0.2)
BASOPHILS NFR BLD AUTO: 0.3 % — SIGNIFICANT CHANGE UP (ref 0–2)
BILIRUB SERPL-MCNC: 0.3 MG/DL — SIGNIFICANT CHANGE UP (ref 0.2–1.2)
BILIRUB UR-MCNC: NEGATIVE — SIGNIFICANT CHANGE UP
BUN SERPL-MCNC: 7 MG/DL — SIGNIFICANT CHANGE UP (ref 7–18)
CALCIUM SERPL-MCNC: 9.4 MG/DL — SIGNIFICANT CHANGE UP (ref 8.4–10.5)
CHLORIDE SERPL-SCNC: 106 MMOL/L — SIGNIFICANT CHANGE UP (ref 96–108)
CO2 SERPL-SCNC: 23 MMOL/L — SIGNIFICANT CHANGE UP (ref 22–31)
COLOR SPEC: YELLOW — SIGNIFICANT CHANGE UP
CREAT ?TM UR-MCNC: 148 MG/DL — SIGNIFICANT CHANGE UP
CREAT SERPL-MCNC: 0.59 MG/DL — SIGNIFICANT CHANGE UP (ref 0.5–1.3)
DIFF PNL FLD: NEGATIVE — SIGNIFICANT CHANGE UP
EOSINOPHIL # BLD AUTO: 0.07 K/UL — SIGNIFICANT CHANGE UP (ref 0–0.5)
EOSINOPHIL NFR BLD AUTO: 0.7 % — SIGNIFICANT CHANGE UP (ref 0–6)
EPI CELLS # UR: ABNORMAL /HPF
FIBRINOGEN PPP-MCNC: 664 MG/DL — HIGH (ref 350–510)
GLUCOSE SERPL-MCNC: 82 MG/DL — SIGNIFICANT CHANGE UP (ref 70–99)
GLUCOSE UR QL: NEGATIVE — SIGNIFICANT CHANGE UP
HCT VFR BLD CALC: 39.6 % — SIGNIFICANT CHANGE UP (ref 34.5–45)
HGB BLD-MCNC: 13.4 G/DL — SIGNIFICANT CHANGE UP (ref 11.5–15.5)
IMM GRANULOCYTES NFR BLD AUTO: 1.5 % — SIGNIFICANT CHANGE UP (ref 0–1.5)
INR BLD: 0.93 RATIO — SIGNIFICANT CHANGE UP (ref 0.88–1.16)
KETONES UR-MCNC: NEGATIVE — SIGNIFICANT CHANGE UP
LDH SERPL L TO P-CCNC: 173 U/L — SIGNIFICANT CHANGE UP (ref 120–225)
LEUKOCYTE ESTERASE UR-ACNC: NEGATIVE — SIGNIFICANT CHANGE UP
LYMPHOCYTES # BLD AUTO: 1.98 K/UL — SIGNIFICANT CHANGE UP (ref 1–3.3)
LYMPHOCYTES # BLD AUTO: 19.6 % — SIGNIFICANT CHANGE UP (ref 13–44)
MCHC RBC-ENTMCNC: 29.6 PG — SIGNIFICANT CHANGE UP (ref 27–34)
MCHC RBC-ENTMCNC: 33.8 GM/DL — SIGNIFICANT CHANGE UP (ref 32–36)
MCV RBC AUTO: 87.6 FL — SIGNIFICANT CHANGE UP (ref 80–100)
MONOCYTES # BLD AUTO: 1 K/UL — HIGH (ref 0–0.9)
MONOCYTES NFR BLD AUTO: 9.9 % — SIGNIFICANT CHANGE UP (ref 2–14)
NEUTROPHILS # BLD AUTO: 6.87 K/UL — SIGNIFICANT CHANGE UP (ref 1.8–7.4)
NEUTROPHILS NFR BLD AUTO: 68 % — SIGNIFICANT CHANGE UP (ref 43–77)
NITRITE UR-MCNC: NEGATIVE — SIGNIFICANT CHANGE UP
NRBC # BLD: 0 /100 WBCS — SIGNIFICANT CHANGE UP (ref 0–0)
PH UR: 6 — SIGNIFICANT CHANGE UP (ref 5–8)
PLATELET # BLD AUTO: 156 K/UL — SIGNIFICANT CHANGE UP (ref 150–400)
POTASSIUM SERPL-MCNC: 3.7 MMOL/L — SIGNIFICANT CHANGE UP (ref 3.5–5.3)
POTASSIUM SERPL-SCNC: 3.7 MMOL/L — SIGNIFICANT CHANGE UP (ref 3.5–5.3)
PROT SERPL-MCNC: 7 G/DL — SIGNIFICANT CHANGE UP (ref 6–8.3)
PROT UR-MCNC: 30 MG/DL
PROTHROM AB SERPL-ACNC: 10.3 SEC — SIGNIFICANT CHANGE UP (ref 10–12.9)
RBC # BLD: 4.52 M/UL — SIGNIFICANT CHANGE UP (ref 3.8–5.2)
RBC # FLD: 12.8 % — SIGNIFICANT CHANGE UP (ref 10.3–14.5)
RBC CASTS # UR COMP ASSIST: SIGNIFICANT CHANGE UP /HPF (ref 0–2)
SODIUM SERPL-SCNC: 138 MMOL/L — SIGNIFICANT CHANGE UP (ref 135–145)
SP GR SPEC: 1.02 — SIGNIFICANT CHANGE UP (ref 1.01–1.02)
URATE SERPL-MCNC: 3.3 MG/DL — SIGNIFICANT CHANGE UP (ref 2.5–7)
UROBILINOGEN FLD QL: NEGATIVE — SIGNIFICANT CHANGE UP
WBC # BLD: 10.1 K/UL — SIGNIFICANT CHANGE UP (ref 3.8–10.5)
WBC # FLD AUTO: 10.1 K/UL — SIGNIFICANT CHANGE UP (ref 3.8–10.5)
WBC UR QL: SIGNIFICANT CHANGE UP /HPF (ref 0–5)

## 2019-09-04 PROCEDURE — 76818 FETAL BIOPHYS PROFILE W/NST: CPT

## 2019-09-04 PROCEDURE — 76815 OB US LIMITED FETUS(S): CPT | Mod: 26

## 2019-09-04 PROCEDURE — 76816 OB US FOLLOW-UP PER FETUS: CPT

## 2019-09-04 PROCEDURE — 0502F SUBSEQUENT PRENATAL CARE: CPT | Mod: 25

## 2019-09-04 PROCEDURE — 81003 URINALYSIS AUTO W/O SCOPE: CPT | Mod: QW

## 2019-09-04 PROCEDURE — 76818 FETAL BIOPHYS PROFILE W/NST: CPT | Mod: 26

## 2019-09-04 RX ORDER — CITRIC ACID/SODIUM CITRATE 300-500 MG
15 SOLUTION, ORAL ORAL EVERY 6 HOURS
Refills: 0 | Status: DISCONTINUED | OUTPATIENT
Start: 2019-09-04 | End: 2019-09-05

## 2019-09-04 RX ORDER — OXYTOCIN 10 UNIT/ML
333.33 VIAL (ML) INJECTION
Qty: 20 | Refills: 0 | Status: DISCONTINUED | OUTPATIENT
Start: 2019-09-04 | End: 2019-09-05

## 2019-09-04 RX ORDER — SODIUM CHLORIDE 9 MG/ML
1000 INJECTION, SOLUTION INTRAVENOUS
Refills: 0 | Status: DISCONTINUED | OUTPATIENT
Start: 2019-09-04 | End: 2019-09-05

## 2019-09-04 RX ADMIN — SODIUM CHLORIDE 125 MILLILITER(S): 9 INJECTION, SOLUTION INTRAVENOUS at 18:47

## 2019-09-05 LAB
T PALLIDUM AB TITR SER: NEGATIVE — SIGNIFICANT CHANGE UP

## 2019-09-05 PROCEDURE — 59409 OBSTETRICAL CARE: CPT | Mod: U9,TH

## 2019-09-05 RX ORDER — MAGNESIUM HYDROXIDE 400 MG/1
30 TABLET, CHEWABLE ORAL
Refills: 0 | Status: DISCONTINUED | OUTPATIENT
Start: 2019-09-05 | End: 2019-09-07

## 2019-09-05 RX ORDER — TETANUS TOXOID, REDUCED DIPHTHERIA TOXOID AND ACELLULAR PERTUSSIS VACCINE, ADSORBED 5; 2.5; 8; 8; 2.5 [IU]/.5ML; [IU]/.5ML; UG/.5ML; UG/.5ML; UG/.5ML
0.5 SUSPENSION INTRAMUSCULAR ONCE
Refills: 0 | Status: DISCONTINUED | OUTPATIENT
Start: 2019-09-05 | End: 2019-09-07

## 2019-09-05 RX ORDER — PRAMOXINE HYDROCHLORIDE 150 MG/15G
1 AEROSOL, FOAM RECTAL EVERY 4 HOURS
Refills: 0 | Status: DISCONTINUED | OUTPATIENT
Start: 2019-09-05 | End: 2019-09-07

## 2019-09-05 RX ORDER — SODIUM CHLORIDE 9 MG/ML
1000 INJECTION, SOLUTION INTRAVENOUS ONCE
Refills: 0 | Status: COMPLETED | OUTPATIENT
Start: 2019-09-05 | End: 2019-09-05

## 2019-09-05 RX ORDER — OXYTOCIN 10 UNIT/ML
333.33 VIAL (ML) INJECTION
Qty: 20 | Refills: 0 | Status: DISCONTINUED | OUTPATIENT
Start: 2019-09-05 | End: 2019-09-07

## 2019-09-05 RX ORDER — DIPHENHYDRAMINE HCL 50 MG
25 CAPSULE ORAL EVERY 6 HOURS
Refills: 0 | Status: DISCONTINUED | OUTPATIENT
Start: 2019-09-05 | End: 2019-09-07

## 2019-09-05 RX ORDER — OXYCODONE HYDROCHLORIDE 5 MG/1
5 TABLET ORAL ONCE
Refills: 0 | Status: DISCONTINUED | OUTPATIENT
Start: 2019-09-05 | End: 2019-09-07

## 2019-09-05 RX ORDER — IBUPROFEN 200 MG
600 TABLET ORAL EVERY 6 HOURS
Refills: 0 | Status: COMPLETED | OUTPATIENT
Start: 2019-09-05 | End: 2020-08-03

## 2019-09-05 RX ORDER — DIBUCAINE 1 %
1 OINTMENT (GRAM) RECTAL EVERY 6 HOURS
Refills: 0 | Status: DISCONTINUED | OUTPATIENT
Start: 2019-09-05 | End: 2019-09-07

## 2019-09-05 RX ORDER — HYDROCORTISONE 1 %
1 OINTMENT (GRAM) TOPICAL EVERY 6 HOURS
Refills: 0 | Status: DISCONTINUED | OUTPATIENT
Start: 2019-09-05 | End: 2019-09-07

## 2019-09-05 RX ORDER — KETOROLAC TROMETHAMINE 30 MG/ML
30 SYRINGE (ML) INJECTION ONCE
Refills: 0 | Status: DISCONTINUED | OUTPATIENT
Start: 2019-09-05 | End: 2019-09-05

## 2019-09-05 RX ORDER — ACETAMINOPHEN 500 MG
975 TABLET ORAL
Refills: 0 | Status: DISCONTINUED | OUTPATIENT
Start: 2019-09-05 | End: 2019-09-07

## 2019-09-05 RX ORDER — OXYCODONE HYDROCHLORIDE 5 MG/1
5 TABLET ORAL
Refills: 0 | Status: DISCONTINUED | OUTPATIENT
Start: 2019-09-05 | End: 2019-09-07

## 2019-09-05 RX ORDER — BENZOCAINE 10 %
1 GEL (GRAM) MUCOUS MEMBRANE EVERY 6 HOURS
Refills: 0 | Status: DISCONTINUED | OUTPATIENT
Start: 2019-09-05 | End: 2019-09-07

## 2019-09-05 RX ORDER — SODIUM CHLORIDE 9 MG/ML
1000 INJECTION, SOLUTION INTRAVENOUS
Refills: 0 | Status: DISCONTINUED | OUTPATIENT
Start: 2019-09-05 | End: 2019-09-06

## 2019-09-05 RX ORDER — GLYCERIN ADULT
1 SUPPOSITORY, RECTAL RECTAL AT BEDTIME
Refills: 0 | Status: DISCONTINUED | OUTPATIENT
Start: 2019-09-05 | End: 2019-09-07

## 2019-09-05 RX ORDER — IBUPROFEN 200 MG
600 TABLET ORAL EVERY 6 HOURS
Refills: 0 | Status: DISCONTINUED | OUTPATIENT
Start: 2019-09-05 | End: 2019-09-07

## 2019-09-05 RX ORDER — SODIUM CHLORIDE 9 MG/ML
3 INJECTION INTRAMUSCULAR; INTRAVENOUS; SUBCUTANEOUS EVERY 8 HOURS
Refills: 0 | Status: DISCONTINUED | OUTPATIENT
Start: 2019-09-05 | End: 2019-09-07

## 2019-09-05 RX ORDER — DOCUSATE SODIUM 100 MG
100 CAPSULE ORAL
Refills: 0 | Status: DISCONTINUED | OUTPATIENT
Start: 2019-09-05 | End: 2019-09-07

## 2019-09-05 RX ORDER — LANOLIN
1 OINTMENT (GRAM) TOPICAL EVERY 6 HOURS
Refills: 0 | Status: DISCONTINUED | OUTPATIENT
Start: 2019-09-05 | End: 2019-09-07

## 2019-09-05 RX ORDER — SIMETHICONE 80 MG/1
80 TABLET, CHEWABLE ORAL EVERY 4 HOURS
Refills: 0 | Status: DISCONTINUED | OUTPATIENT
Start: 2019-09-05 | End: 2019-09-07

## 2019-09-05 RX ORDER — AER TRAVELER 0.5 G/1
1 SOLUTION RECTAL; TOPICAL EVERY 4 HOURS
Refills: 0 | Status: DISCONTINUED | OUTPATIENT
Start: 2019-09-05 | End: 2019-09-07

## 2019-09-05 RX ADMIN — Medication 30 MILLIGRAM(S): at 08:50

## 2019-09-05 RX ADMIN — Medication 1 TABLET(S): at 11:13

## 2019-09-05 RX ADMIN — Medication 975 MILLIGRAM(S): at 15:31

## 2019-09-05 RX ADMIN — Medication 1000 MILLIUNIT(S)/MIN: at 07:26

## 2019-09-05 RX ADMIN — SODIUM CHLORIDE 3 MILLILITER(S): 9 INJECTION INTRAMUSCULAR; INTRAVENOUS; SUBCUTANEOUS at 15:02

## 2019-09-05 RX ADMIN — SODIUM CHLORIDE 125 MILLILITER(S): 9 INJECTION, SOLUTION INTRAVENOUS at 06:30

## 2019-09-05 RX ADMIN — Medication 975 MILLIGRAM(S): at 15:01

## 2019-09-05 RX ADMIN — SODIUM CHLORIDE 3 MILLILITER(S): 9 INJECTION INTRAMUSCULAR; INTRAVENOUS; SUBCUTANEOUS at 22:00

## 2019-09-05 RX ADMIN — SODIUM CHLORIDE 2000 MILLILITER(S): 9 INJECTION, SOLUTION INTRAVENOUS at 08:51

## 2019-09-06 ENCOUNTER — TRANSCRIPTION ENCOUNTER (OUTPATIENT)
Age: 29
End: 2019-09-06

## 2019-09-06 DIAGNOSIS — O13.9 GESTATIONAL [PREGNANCY-INDUCED] HYPERTENSION WITHOUT SIGNIFICANT PROTEINURIA, UNSPECIFIED TRIMESTER: ICD-10-CM

## 2019-09-06 LAB
BASOPHILS # BLD AUTO: 0.05 K/UL — SIGNIFICANT CHANGE UP (ref 0–0.2)
BASOPHILS NFR BLD AUTO: 0.4 % — SIGNIFICANT CHANGE UP (ref 0–2)
EOSINOPHIL # BLD AUTO: 0.16 K/UL — SIGNIFICANT CHANGE UP (ref 0–0.5)
EOSINOPHIL NFR BLD AUTO: 1.2 % — SIGNIFICANT CHANGE UP (ref 0–6)
HCT VFR BLD CALC: 36.2 % — SIGNIFICANT CHANGE UP (ref 34.5–45)
HGB BLD-MCNC: 11.8 G/DL — SIGNIFICANT CHANGE UP (ref 11.5–15.5)
IMM GRANULOCYTES NFR BLD AUTO: 1 % — SIGNIFICANT CHANGE UP (ref 0–1.5)
LYMPHOCYTES # BLD AUTO: 17.7 % — SIGNIFICANT CHANGE UP (ref 13–44)
LYMPHOCYTES # BLD AUTO: 2.39 K/UL — SIGNIFICANT CHANGE UP (ref 1–3.3)
MCHC RBC-ENTMCNC: 28.9 PG — SIGNIFICANT CHANGE UP (ref 27–34)
MCHC RBC-ENTMCNC: 32.6 GM/DL — SIGNIFICANT CHANGE UP (ref 32–36)
MCV RBC AUTO: 88.7 FL — SIGNIFICANT CHANGE UP (ref 80–100)
MONOCYTES # BLD AUTO: 1.18 K/UL — HIGH (ref 0–0.9)
MONOCYTES NFR BLD AUTO: 8.8 % — SIGNIFICANT CHANGE UP (ref 2–14)
NEUTROPHILS # BLD AUTO: 9.55 K/UL — HIGH (ref 1.8–7.4)
NEUTROPHILS NFR BLD AUTO: 70.9 % — SIGNIFICANT CHANGE UP (ref 43–77)
NRBC # BLD: 0 /100 WBCS — SIGNIFICANT CHANGE UP (ref 0–0)
PLATELET # BLD AUTO: 149 K/UL — LOW (ref 150–400)
RBC # BLD: 4.08 M/UL — SIGNIFICANT CHANGE UP (ref 3.8–5.2)
RBC # FLD: 13 % — SIGNIFICANT CHANGE UP (ref 10.3–14.5)
WBC # BLD: 13.47 K/UL — HIGH (ref 3.8–10.5)
WBC # FLD AUTO: 13.47 K/UL — HIGH (ref 3.8–10.5)

## 2019-09-06 RX ORDER — HYDROCORTISONE 1 %
1 OINTMENT (GRAM) TOPICAL
Refills: 0 | Status: DISCONTINUED | OUTPATIENT
Start: 2019-09-06 | End: 2019-09-07

## 2019-09-06 RX ORDER — ASCORBIC ACID 60 MG
500 TABLET,CHEWABLE ORAL DAILY
Refills: 0 | Status: DISCONTINUED | OUTPATIENT
Start: 2019-09-06 | End: 2019-09-07

## 2019-09-06 RX ORDER — FERROUS SULFATE 325(65) MG
1 TABLET ORAL
Qty: 90 | Refills: 0
Start: 2019-09-06 | End: 2019-10-05

## 2019-09-06 RX ORDER — IBUPROFEN 200 MG
1 TABLET ORAL
Qty: 20 | Refills: 0
Start: 2019-09-06

## 2019-09-06 RX ORDER — DOCUSATE SODIUM 100 MG
1 CAPSULE ORAL
Qty: 60 | Refills: 0
Start: 2019-09-06 | End: 2019-10-05

## 2019-09-06 RX ADMIN — SODIUM CHLORIDE 3 MILLILITER(S): 9 INJECTION INTRAMUSCULAR; INTRAVENOUS; SUBCUTANEOUS at 17:48

## 2019-09-06 RX ADMIN — Medication 600 MILLIGRAM(S): at 11:53

## 2019-09-06 RX ADMIN — Medication 100 MILLIGRAM(S): at 21:14

## 2019-09-06 RX ADMIN — Medication 600 MILLIGRAM(S): at 12:30

## 2019-09-06 RX ADMIN — Medication 975 MILLIGRAM(S): at 09:00

## 2019-09-06 RX ADMIN — Medication 1 TABLET(S): at 11:53

## 2019-09-06 RX ADMIN — Medication 600 MILLIGRAM(S): at 00:50

## 2019-09-06 RX ADMIN — Medication 975 MILLIGRAM(S): at 09:30

## 2019-09-06 RX ADMIN — Medication 600 MILLIGRAM(S): at 01:30

## 2019-09-06 RX ADMIN — Medication 975 MILLIGRAM(S): at 21:11

## 2019-09-06 RX ADMIN — Medication 500 MILLIGRAM(S): at 11:53

## 2019-09-06 RX ADMIN — Medication 975 MILLIGRAM(S): at 21:41

## 2019-09-06 RX ADMIN — SODIUM CHLORIDE 3 MILLILITER(S): 9 INJECTION INTRAMUSCULAR; INTRAVENOUS; SUBCUTANEOUS at 22:00

## 2019-09-06 RX ADMIN — SODIUM CHLORIDE 3 MILLILITER(S): 9 INJECTION INTRAMUSCULAR; INTRAVENOUS; SUBCUTANEOUS at 06:31

## 2019-09-06 NOTE — PROGRESS NOTE ADULT - SUBJECTIVE AND OBJECTIVE BOX
Patient evaluated at bedside, offers no acute complaints.  Resting comfortably with baby at bedside.  Tolerating regular diet, Voiding without difficulty; +flatus, -BM  Currently breast and bottlefeeding.  Denies fever/chills, nausea/vomiting, headache, dizziness, chest pains, shortness of breath, palpitations    Vital Signs Last 24 Hrs  T(C): 36.7 (06 Sep 2019 05:46), Max: 37.1 (05 Sep 2019 10:25)  T(F): 98 (06 Sep 2019 05:46), Max: 98.7 (05 Sep 2019 10:25)  HR: 86 (06 Sep 2019 05:46) (86 - 102)  BP: 109/60 (06 Sep 2019 05:46) (98/63 - 122/73)  BP(mean): 75 (05 Sep 2019 10:25) (75 - 75)  RR: 16 (06 Sep 2019 05:46) (16 - 18)  SpO2: 97% (06 Sep 2019 05:46) (95% - 99%)    PE: Pt appears comfortable, NAD, AAOx3  Chest: CTA bilaterally, no W/R/R  Cardiac: RRR  Breasts: soft, NT/nonengorged bilaterally; no masses, no erythema  Abd: soft; NT; no guarding or rebound; +BS x4 quad; Fundus firm NT below umbilicus  Gyn: moderate lochia  Ext: soft, NT; DTRs 2+ bilaterally; no worsening edema                          11.8   13.47 )-----------( 149      ( 06 Sep 2019 06:58 )             36.2     09-04    138  |  106  |  7   ----------------------------<  82  3.7   |  23  |  0.59    Ca    9.4      04 Sep 2019 16:36    TPro  7.0  /  Alb  2.9<L>  /  TBili  0.3  /  DBili  x   /  AST  23  /  ALT  26  /  AlkPhos  171<H>  09-04      d/w Dr. Puga

## 2019-09-06 NOTE — DISCHARGE NOTE OB - PATIENT PORTAL LINK FT
You can access the FollowMyHealth Patient Portal offered by [facility] by registering at the following website: [email or web address]. By joining [facility]’s FollowMyHealth portal, you will also be able to view your health information using other applications (apps) compatible with our system.

## 2019-09-06 NOTE — DISCHARGE NOTE OB - MEDICATION SUMMARY - MEDICATIONS TO TAKE
I will START or STAY ON the medications listed below when I get home from the hospital:    blood pressure kit  -- Apply on skin to affected area 2 times a day   -- Indication: For Gestational hypertension    ibuprofen 600 mg oral tablet  -- 1 tab(s) by mouth every 6 hours   -- Do not take this drug if you are pregnant.  It is very important that you take or use this exactly as directed.  Do not skip doses or discontinue unless directed by your doctor.  May cause drowsiness or dizziness.  Obtain medical advice before taking any non-prescription drugs as some may affect the action of this medication.  Take with food or milk.    -- Indication: For  (normal spontaneous vaginal delivery)    ferrous sulfate 325 mg (65 mg elemental iron) oral tablet  -- 1 tab(s) by mouth 3 times a day   -- Check with your doctor before becoming pregnant.  Do not chew, break, or crush.  May discolor urine or feces.    -- Indication: For  (normal spontaneous vaginal delivery)    Colace 100 mg oral capsule  -- 1 cap(s) by mouth 2 times a day   -- Medication should be taken with plenty of water.    -- Indication: For  (normal spontaneous vaginal delivery)

## 2019-09-06 NOTE — DISCHARGE NOTE OB - ADDITIONAL INSTRUCTIONS
case mngt setup, take blood pressure twice daily and record the readings, if BP consistently >150/100 and or s/s of preeclampsia present, call your doctor or go to the ER  no sex, nothing in the vagina for 6 weeks, no heavy lifting/pushing, no bending for 4 weeks, no strenuous activity, motrin prn for pain, keep incision clean and dry, shower daily, ambulate, fiber diet, f/u in 2 weeks for wound check and in 6 weeks for postpartum visit

## 2019-09-06 NOTE — DISCHARGE NOTE OB - CARE PROVIDER_API CALL
Nahid Puga)  OBGYN at [address] [place] [address]  [place] [address]  Phone: [phone]  Fax: [phone]  Follow Up Time:

## 2019-09-06 NOTE — DISCHARGE NOTE OB - MEDICATION SUMMARY - MEDICATIONS TO STOP TAKING
I will STOP taking the medications listed below when I get home from the hospital:    doxylamine-pyridoxine 10 mg-10 mg oral delayed release tablet  -- 2 tab(s) by mouth once a day (at bedtime)  -- Do not chew, break, or crush.  Do not drink alcoholic beverages when taking this medication.  May cause drowsiness.  Alcohol may intensify this effect.  Use care when operating dangerous machinery.  Some non-prescription drugs may aggravate your condition.  Read all labels carefully.  If a warning appears, check with your doctor before taking.  Swallow whole.  Do not crush.  Take medication on an empty stomach 1 hour before or 2 to 3 hours after a meal unless otherwise directed by your doctor.  This drug may impair the ability to drive or operate machinery.  Use care until you become familiar with its effects.

## 2019-09-06 NOTE — DISCHARGE NOTE OB - REASON FOR ADMISSION
medical iol gestational hypertension@ 40 weeks 1 day medical induction gestational hypertension@ 40 weeks 1 day

## 2019-09-06 NOTE — DISCHARGE NOTE OB - PLAN OF CARE
postpartum care, pain mngt, breastfeeding education no sex, nothing in the vagina for 6 weeks, no strenuous activity, continue prenatal vitamins while breastfeeding, motrin prn for pain, f/u in the office in 5-6 weeks for postpartum visit case mngt setup, take blood pressure twice daily and record the readings, if BP consistently >150/100 and or s/s of preeclampsia present, call your doctor or go to the ER

## 2019-09-06 NOTE — DISCHARGE NOTE OB - MATERIALS PROVIDED
Vaccinations/[place]  Screening Program/  Immunization Record/Breastfeeding Mother’s Support Group Information/Guide to Postpartum Care/Back To Sleep Handout/Breastfeeding Guide and Packet/Birth Certificate Instructions

## 2019-09-07 VITALS
HEART RATE: 77 BPM | OXYGEN SATURATION: 99 % | RESPIRATION RATE: 17 BRPM | TEMPERATURE: 98 F | DIASTOLIC BLOOD PRESSURE: 68 MMHG | SYSTOLIC BLOOD PRESSURE: 123 MMHG

## 2019-09-07 PROCEDURE — 83615 LACTATE (LD) (LDH) ENZYME: CPT

## 2019-09-07 PROCEDURE — 84550 ASSAY OF BLOOD/URIC ACID: CPT

## 2019-09-07 PROCEDURE — 82570 ASSAY OF URINE CREATININE: CPT

## 2019-09-07 PROCEDURE — 59050 FETAL MONITOR W/REPORT: CPT

## 2019-09-07 PROCEDURE — 76815 OB US LIMITED FETUS(S): CPT

## 2019-09-07 PROCEDURE — 76818 FETAL BIOPHYS PROFILE W/NST: CPT

## 2019-09-07 PROCEDURE — 86850 RBC ANTIBODY SCREEN: CPT

## 2019-09-07 PROCEDURE — 86901 BLOOD TYPING SEROLOGIC RH(D): CPT

## 2019-09-07 PROCEDURE — G0463: CPT

## 2019-09-07 PROCEDURE — 86780 TREPONEMA PALLIDUM: CPT

## 2019-09-07 PROCEDURE — 59025 FETAL NON-STRESS TEST: CPT

## 2019-09-07 PROCEDURE — 36415 COLL VENOUS BLD VENIPUNCTURE: CPT

## 2019-09-07 PROCEDURE — 85730 THROMBOPLASTIN TIME PARTIAL: CPT

## 2019-09-07 PROCEDURE — 80053 COMPREHEN METABOLIC PANEL: CPT

## 2019-09-07 PROCEDURE — 84156 ASSAY OF PROTEIN URINE: CPT

## 2019-09-07 PROCEDURE — 85027 COMPLETE CBC AUTOMATED: CPT

## 2019-09-07 PROCEDURE — 81001 URINALYSIS AUTO W/SCOPE: CPT

## 2019-09-07 PROCEDURE — 86900 BLOOD TYPING SEROLOGIC ABO: CPT

## 2019-09-07 PROCEDURE — 85384 FIBRINOGEN ACTIVITY: CPT

## 2019-09-07 PROCEDURE — 85610 PROTHROMBIN TIME: CPT

## 2019-09-07 RX ADMIN — Medication 1 TABLET(S): at 12:55

## 2019-09-07 RX ADMIN — Medication 975 MILLIGRAM(S): at 09:05

## 2019-09-07 RX ADMIN — SODIUM CHLORIDE 3 MILLILITER(S): 9 INJECTION INTRAMUSCULAR; INTRAVENOUS; SUBCUTANEOUS at 05:36

## 2019-09-07 RX ADMIN — Medication 975 MILLIGRAM(S): at 09:40

## 2019-09-07 NOTE — PROGRESS NOTE ADULT - ASSESSMENT
pt seen w in house ob on call dr pete CHAPMAN NOTE:  PPD#2 s/p   [facility] home care set up by case management  bp kit rx given to pt  pt offers no acute complaints   in the room  -per attending in house on call dc home today  -ob check in office 5weeks call and set up appointment  -dc instructions verbalized

## 2019-09-07 NOTE — PROGRESS NOTE ADULT - SUBJECTIVE AND OBJECTIVE BOX
pt seen w in house ob on call dr pete CHAPMAN NOTE:  PPD#2 s/p   [facility] home care set up by case management  bp kit rx given to pt  pt offers no acute complaints   in the room    Patient seen at bedside resting comfortably offers no current complaints.  Ambulating and voiding without difficulty.  Passing flatus and tolerating regular diet.  both breast/bottle feeding.  Denies HA, CP, SOB, N/V/D, dizziness, palpitations, worsening abdominal pain, worsening vaginal bleeding, or any other concerns.      Vital Signs Last 24 Hrs  T(C): 36.7 (07 Sep 2019 06:49), Max: 36.8 (06 Sep 2019 18:09)  T(F): 98.1 (07 Sep 2019 06:49), Max: 98.3 (06 Sep 2019 18:09)  HR: 77 (07 Sep 2019 06:49) (77 - 78)  BP: 123/68 (07 Sep 2019 06:49) (114/65 - 123/68)  BP(mean): --  RR: 17 (07 Sep 2019 06:49) (17 - 17)  SpO2: 99% (07 Sep 2019 06:49) (99% - 99%)    Physical Exam:     Gen: A&Ox 3, NAD  Chest: CTA B/L  Cardiac: S1+S2; RRR  Breast: Soft, nontender, nonengorged  Abdomen: +BS; Soft, nontender, ND; Fundus firm below umbilicus  Gyn: Min lochia  Ext: Nontender, DTRS 2+, no worsening edema                          11.8   13.47 )-----------( 149      ( 06 Sep 2019 06:58 )             36.2

## 2019-09-10 ENCOUNTER — TRANSCRIPTION ENCOUNTER (OUTPATIENT)
Age: 29
End: 2019-09-10

## 2019-09-12 ENCOUNTER — APPOINTMENT (OUTPATIENT)
Dept: OBGYN | Facility: CLINIC | Age: 29
End: 2019-09-12
Payer: MEDICAID

## 2019-09-12 VITALS
TEMPERATURE: 98.2 F | HEART RATE: 80 BPM | DIASTOLIC BLOOD PRESSURE: 76 MMHG | WEIGHT: 165 LBS | HEIGHT: 68 IN | BODY MASS INDEX: 25.01 KG/M2 | OXYGEN SATURATION: 98 % | SYSTOLIC BLOOD PRESSURE: 121 MMHG

## 2019-09-12 DIAGNOSIS — O48.0 POST-TERM PREGNANCY: ICD-10-CM

## 2019-09-12 DIAGNOSIS — Z34.93 ENCOUNTER FOR SUPERVISION OF NORMAL PREGNANCY, UNSPECIFIED, THIRD TRIMESTER: ICD-10-CM

## 2019-09-12 DIAGNOSIS — O99.619 DISEASES OF THE DIGESTIVE SYSTEM COMPLICATING PREGNANCY, UNSPECIFIED TRIMESTER: ICD-10-CM

## 2019-09-12 DIAGNOSIS — K59.00 DISEASES OF THE DIGESTIVE SYSTEM COMPLICATING PREGNANCY, UNSPECIFIED TRIMESTER: ICD-10-CM

## 2019-09-12 PROCEDURE — 0503F POSTPARTUM CARE VISIT: CPT

## 2019-09-12 NOTE — HISTORY OF PRESENT ILLNESS
[Postpartum Follow Up] : postpartum follow up [Complications:___] : complications include: [unfilled] [Last Pap Date: ___] : Last Pap Date: [unfilled] [Delivery Date: ___] : on [unfilled] [] : delivered by vaginal delivery [Breastfeeding] : currently nursing [BF with Difficulty] : nursing with difficulty [None] : No associated symptoms are reported [Awake] : awake [Alert] : alert [Acute Distress] : no acute distress [Mass] : no breast mass [Tender] : non tender [Soft] : soft [Distended] : not distended [H/Smegaly] : no hepatosplenomegaly [Oriented x3] : oriented to person, place, and time [RRR, No Murmurs] : RRR, no murmurs [CTAB] : CTAB [Doing Well] : is doing well [No [place]] : to avoid sexual intercourse [No Tampons/Suppositories] : against using tampons or vaginal suppositories [Limited Work] : to work with limitations [Limited ADLs] : to participate in activities of daily living with limitations [Limited Housework] : to do housework with limitations [No Sports] : not to participate in sports [FreeTextEntry8] : patient here for bp check for gHTN. no complaints. denies ha, vision changes, ruq/epigastric pain, cp ,sob.  [de-identified] : s/p lactation consultation now reports doing much better  [de-identified] : ext: NTBL, no edema [de-identified] : bp well controlled at this time. no ssx of PEC [de-identified] : con't home bp monitoring, ssx of PEC disucssed and pt understands to go to ED. otherwise RTC in 5wks for postpartum visit.

## 2019-09-19 DIAGNOSIS — Z71.89 OTHER SPECIFIED COUNSELING: ICD-10-CM

## 2019-10-10 ENCOUNTER — APPOINTMENT (OUTPATIENT)
Dept: OBGYN | Facility: CLINIC | Age: 29
End: 2019-10-10
Payer: MEDICAID

## 2019-10-10 VITALS
SYSTOLIC BLOOD PRESSURE: 137 MMHG | BODY MASS INDEX: 24.4 KG/M2 | HEIGHT: 68 IN | DIASTOLIC BLOOD PRESSURE: 89 MMHG | HEART RATE: 102 BPM | OXYGEN SATURATION: 98 % | TEMPERATURE: 98 F | WEIGHT: 161 LBS

## 2019-10-10 DIAGNOSIS — Z30.09 ENCOUNTER FOR OTHER GENERAL COUNSELING AND ADVICE ON CONTRACEPTION: ICD-10-CM

## 2019-10-10 DIAGNOSIS — Z01.30 ENCOUNTER FOR EXAMINATION OF BLOOD PRESSURE W/OUT ABNORMAL FINDINGS: ICD-10-CM

## 2019-10-10 PROCEDURE — 0503F POSTPARTUM CARE VISIT: CPT

## 2019-10-10 RX ORDER — NORGESTIMATE AND ETHINYL ESTRADIOL 0.25-0.035
0.25-35 KIT ORAL DAILY
Qty: 1 | Refills: 11 | Status: ACTIVE | COMMUNITY
Start: 2019-10-10 | End: 1900-01-01

## 2019-10-11 ENCOUNTER — TRANSCRIPTION ENCOUNTER (OUTPATIENT)
Age: 29
End: 2019-10-11

## 2019-10-14 PROBLEM — Z01.30 BLOOD PRESSURE CHECK: Status: RESOLVED | Noted: 2019-09-12 | Resolved: 2019-10-14

## 2019-10-14 NOTE — HISTORY OF PRESENT ILLNESS
[Postpartum Follow Up] : postpartum follow up [Complications:___] : complications include: [unfilled] [Last Pap Date: ___] : Last Pap Date: [unfilled] [Delivery Date: ___] : on [unfilled] [] : delivered by vaginal delivery [Breastfeeding] : currently nursing [Intended Contraception] : Intended Contraception: [Back to Normal] : is back to normal in size [Normal] : the vagina was normal [Healing Well] : is healing well [Examination Of The Breasts] : breasts are normal [Doing Well] : is doing well [No Sign of Infection] : is showing no signs of infection [Excellent Pain Control] : has excellent pain control [None] : None [Oral Contraceptives] : oral contraceptives [Resumed Menses] : has not resumed her menses [Resumed [place]] : has not resumed intercourse [FreeTextEntry8] : patient here for 6 wks postpartum visit. no complaints. ambulating, voiding, tolerating diet.  [de-identified] : [] birth control counseling provided. risks of short interval pregnancies discussed. pt would like to go on OCPs. instrucitons provided. pt denied hx of migraines, HTN, cigarette smoking, VTE [] pap/hpv (1/2019)- ASCUS/HPV(-), repeat in 3years per [facility] RTC in 1yr for annual exam

## 2020-12-23 NOTE — ED ADULT NURSE NOTE - CHIEF COMPLAINT QUOTE
- BMI 37.16 today  - will get screening blood work: A1C + lipid panel  - discussed importance of eating a healthy low fat diet and exercising regularly to help with weight loss. Educational hand out provided     Also c/o body aches.  LMP 11/27/18

## 2022-04-21 NOTE — PATIENT PROFILE OB - PT NEEDS ASSIST
Pt will follow 100% simple directions within 4 weeks. Pt will be A+O x 3 (self, place, date), with cues as needed, within 4 weeks
no

## 2022-10-25 NOTE — PATIENT PROFILE OB - PRETERM DELIVERIES, OB PROFILE
Follow up with OB in clinic tomorrow  Take the TXA three times daily for the next 5 days  Return with any lightheadedness, passing out, heavier bleeding, worsening pain, or any new symptoms 0

## 2023-11-13 NOTE — DISCHARGE NOTE OB - CARE PLAN
Principal Discharge DX:	 (normal spontaneous vaginal delivery)  Goal:	postpartum care, pain mngt, breastfeeding education  Assessment and plan of treatment:	no sex, nothing in the vagina for 6 weeks, no strenuous activity, continue prenatal vitamins while breastfeeding, motrin prn for pain, f/u in the office in 5-6 weeks for postpartum visit  Secondary Diagnosis:	Gestational hypertension  Assessment and plan of treatment:	case mngt setup, take blood pressure twice daily and record the readings, if BP consistently >150/100 and or s/s of preeclampsia present, call your doctor or go to the ER
(4) excellent
no

## 2024-11-07 NOTE — DISCHARGE NOTE OB - FUNCTIONAL SCREEN CURRENT LEVEL: AMBULATION, MLM
You are due for imaging: an ultrasound in 6 months. Please call Central Scheduling at [phone] to schedule this.      Return to clinic: in 6 months with labs drawn about 1 week before your appointment and ultrasound prior.        Please message or call our office with any questions or concerns at [phone] (Amada MADDEN).   
0 = independent